# Patient Record
Sex: MALE | Race: BLACK OR AFRICAN AMERICAN | URBAN - METROPOLITAN AREA
[De-identification: names, ages, dates, MRNs, and addresses within clinical notes are randomized per-mention and may not be internally consistent; named-entity substitution may affect disease eponyms.]

---

## 2017-01-04 ENCOUNTER — OFFICE VISIT (OUTPATIENT)
Dept: FAMILY MEDICINE CLINIC | Age: 32
End: 2017-01-04

## 2017-01-04 VITALS
HEART RATE: 68 BPM | TEMPERATURE: 98.2 F | DIASTOLIC BLOOD PRESSURE: 70 MMHG | HEIGHT: 78 IN | BODY MASS INDEX: 33.44 KG/M2 | WEIGHT: 289 LBS | OXYGEN SATURATION: 97 % | SYSTOLIC BLOOD PRESSURE: 112 MMHG | RESPIRATION RATE: 18 BRPM

## 2017-01-04 DIAGNOSIS — K29.00 OTHER ACUTE GASTRITIS: ICD-10-CM

## 2017-01-04 DIAGNOSIS — R68.89 FLU-LIKE SYMPTOMS: Primary | ICD-10-CM

## 2017-01-04 LAB
QUICKVUE INFLUENZA TEST: NEGATIVE
S PYO AG THROAT QL: NEGATIVE
VALID INTERNAL CONTROL?: YES
VALID INTERNAL CONTROL?: YES

## 2017-01-04 RX ORDER — ONDANSETRON 4 MG/1
4 TABLET, ORALLY DISINTEGRATING ORAL
Qty: 12 TAB | Refills: 0 | Status: SHIPPED | OUTPATIENT
Start: 2017-01-04

## 2017-01-04 NOTE — PROGRESS NOTES
Subjective:      Yumiko Edmonds is a 32 y.o. male with chief complaint of nausea and vomiting for 3 days. No diarrhea. The patient denies bloody or bilious emesis. Still tolerating PO.  4 episodes of vomiting yesterday and 2 today. Subjective fever and chills when sx began but resolved. No self treatments other than vitamin water. No abd pain. No travel. 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 History reviewed. No pertinent past medical history. Current Outpatient Prescriptions   Medication Sig Dispense Refill    ondansetron (ZOFRAN ODT) 4 mg disintegrating tablet Take 1 Tab by mouth every eight (8) hours as needed for Nausea. 12 Tab 0     No Known Allergies    Review of Systems. General/Constitutional:   No fever, weight changes, or headache. Mouth: No sore throat. Neck: No stiffness, pain, or limited movement. Cardiac: No chest pain, palpitations . Respiratory:  No cough, shortness of breath, dyspnea on exertion. GI: As per HPI   Skin: No rash. Musculoskeletal: No weakness, myalgias, or  arthralgias. Neurological: No loss of consciousness, dizziness, or cognitive changes. Objective:     Visit Vitals    /70 (BP 1 Location: Left arm, BP Patient Position: Sitting)    Pulse 68    Temp 98.2 °F (36.8 °C) (Oral)    Resp 18    Ht 6' 9\" (2.057 m)    Wt 289 lb (131.1 kg)    SpO2 97%    BMI 30.97 kg/m2       General: Alert and oriented and in no acute distress. Responds to all questions appropriately. SKIN: No obvious rash. LUNGS: Respirations unlabored; clear to auscultation bilaterally. CARDIOVASCULAR: Regular, rate, and rhythm without murmurs, gallops or rubs. ABDOMEN: No tenderness. Soft; nondistended; normoactive bowel sounds; no masses or organomegaly. No rebound or guarding. EXTREMITIES: No edema, well perfused, moving all extremities equally.   NEUROLOGIC: Speech intact; face symmetrical.    Labs:   Recent Results (from the past 12 hour(s))   AMB POC RAPID STREP A    Collection Time: 01/04/17  1:38 PM   Result Value Ref Range    VALID INTERNAL CONTROL POC Yes     Group A Strep Ag Negative Negative   AMB POC RAPID INFLUENZA TEST    Collection Time: 01/04/17  1:38 PM   Result Value Ref Range    VALID INTERNAL CONTROL POC Yes     QuickVue Influenza test Negative Negative         Assessment:       ICD-10-CM ICD-9-CM    1. Flu-like symptoms R68.89 780.99 AMB POC RAPID STREP A      AMB POC RAPID INFLUENZA TEST   2. Other acute gastritis K29.00     Likely a viral illness. Plan:     GENERAL INSTRUCTIONS:  1. Plenty of fluids:    Adults: Water, Gatorade, decaffeinated defizzed soda, ice chips, etc.    Wait 30-60 min after vomiting to try again. 2. As nausea subsides, advance food as tolerated to bananas, rice, applesauce, toast, tea, and then to a regular diet or full-strength formula  3. No milk, meat, fried/fatty products, aspirin or ibuprofen for several days. 4. Zofran 4mg ODT Q8H PRN  Return to clinic or go to the Emergency Room if you have increased diarrhea, vomiting, fever, pain, swelling of the abdomen, bloody stool or vomitus, or signs of dehydration: decreased urination, dry mouth, fatigue, lightheaded, (suken soft spot, no tears, dry diapers in infants) or no improvement in 48 hours .

## 2017-01-04 NOTE — LETTER
NOTIFICATION RETURN TO WORK / SCHOOL 
 
1/4/2017 2:01 PM 
 
Mr. Kae Wheeler [address] [facility] [address] To Whom It May Concern: 
 
Kae Wheeler is currently under the care of [address] [facility]. He was seen in our office today. He will return to work/school on next scheduled workday. If there are questions or concerns please have the patient contact our office.  
 
 
 
Sincerely, 
 
 
Sal Wang MD

## 2017-01-04 NOTE — MR AVS SNAPSHOT
Visit Information Date & Time Provider Department Dept. Phone Encounter #  
 1/4/2017  1:30 PM Juvemarely Rushing, [address] [facility] [phone] [id number] Upcoming Health Maintenance Date Due DTaP/Tdap/Td series (1 - Tdap) 1/26/2006 INFLUENZA AGE 9 TO ADULT 8/1/2016 Allergies as of 1/4/2017  Review Complete On: 1/4/2017 By: Audi Rushing MD  
 No Known Allergies Current Immunizations  Never Reviewed No immunizations on file. Not reviewed this visit You Were Diagnosed With   
  
 Codes Comments Flu-like symptoms    -  Primary ICD-10-CM: R68.89 ICD-9-CM: 780.99 Other acute gastritis     ICD-10-CM: K29.00 Vitals BP Pulse Temp Resp Height(growth percentile) Weight(growth percentile) 112/70 (BP 1 Location: Left arm, BP Patient Position: Sitting) 68 98.2 °F (36.8 °C) (Oral) 18 6' 9\" (2.057 m) 289 lb (131.1 kg) SpO2 BMI Smoking Status 97% 30.97 kg/m2 Never Smoker Vitals History BMI and BSA Data Body Mass Index Body Surface Area 30.97 kg/m 2 2.74 m 2 Preferred Pharmacy Pharmacy Name Phone [id number], [address] [phone] Your Updated Medication List  
  
   
This list is accurate as of: 1/4/17  4:30 PM.  Always use your most recent med list.  
  
  
  
  
 ondansetron 4 mg disintegrating tablet Commonly known as:  ZOFRAN ODT Take 1 Tab by mouth every eight (8) hours as needed for Nausea. Prescriptions Printed Refills  
 ondansetron (ZOFRAN ODT) 4 mg disintegrating tablet 0 Sig: Take 1 Tab by mouth every eight (8) hours as needed for Nausea. Class: Print Route: Oral  
  
Introducing [facility]! [employer] introduces [employer] patient portal. Now you can access parts of your medical record, email your doctor's office, and request medication refills online.    
 
1. In your internet browser, go to [email or web address] 2. Click on the First Time User? Click Here link in the Sign In box. You will see the New Member Sign Up page. 3. Enter your [employer] Access Code exactly as it appears below. You will not need to use this code after youve completed the sign-up process. If you do not sign up before the expiration date, you must request a new code. · [employer] Access Code: [email or web address] Expires: 4/4/2017  4:30 PM 
 
4. Enter the last four digits of your Social Security Number (xxxx) and Date of Birth (mm/dd/yyyy) as indicated and click Submit. You will be taken to the next sign-up page. 5. Create a [employer] ID. This will be your PayE[employer] login ID and cannot be changed, so think of one that is secure and easy to remember. 6. Create a [employer] password. You can change your password at any time. 7. Enter your Password Reset Question and Answer. This can be used at a later time if you forget your password. 8. Enter your e-mail address. You will receive e-mail notification when new information is available in [address] 9. Click Sign Up. You can now view and download portions of your medical record. 10. Click the Download Summary menu link to download a portable copy of your medical information. If you have questions, please visit the Frequently Asked Questions section of the [employer] website. Remember, [employer] is NOT to be used for urgent needs. For medical emergencies, dial 911. Now available from your iPhone and Android! Please provide this summary of care documentation to your next provider. If you have any questions after today's visit, please call [phone].